# Patient Record
Sex: MALE | Race: WHITE | Employment: FULL TIME | URBAN - METROPOLITAN AREA
[De-identification: names, ages, dates, MRNs, and addresses within clinical notes are randomized per-mention and may not be internally consistent; named-entity substitution may affect disease eponyms.]

---

## 2018-02-27 PROBLEM — F41.9 ANXIETY: Status: ACTIVE | Noted: 2018-02-27

## 2018-02-27 PROBLEM — R45.1 AGITATION: Status: ACTIVE | Noted: 2018-02-27

## 2018-02-27 PROBLEM — F19.91 HISTORY OF DRUG USE: Status: ACTIVE | Noted: 2018-02-27

## 2018-02-27 PROBLEM — E10.10 UNCONTROLLED TYPE 1 DIABETES MELLITUS WITH KETOACIDOSIS WITHOUT COMA (HCC): Status: ACTIVE | Noted: 2018-02-27

## 2018-02-27 PROBLEM — Z86.19 HISTORY OF HEPATITIS C: Status: ACTIVE | Noted: 2018-02-27

## 2018-03-21 ENCOUNTER — HOSPITAL ENCOUNTER (OUTPATIENT)
Age: 18
Discharge: HOME | End: 2018-03-21
Payer: MEDICARE

## 2018-03-21 PROCEDURE — 82105 ALPHA-FETOPROTEIN SERUM: CPT

## 2018-03-23 ENCOUNTER — TELEPHONE (OUTPATIENT)
Dept: FAMILY MEDICINE CLINIC | Age: 18
End: 2018-03-23

## 2018-03-23 LAB — AFP-TUMOR MARKER: 2 NG/ML (ref 0–9)

## 2018-03-28 ENCOUNTER — TELEPHONE (OUTPATIENT)
Dept: FAMILY MEDICINE CLINIC | Age: 18
End: 2018-03-28

## 2018-05-09 DIAGNOSIS — Z86.19 HISTORY OF HEPATITIS C: Primary | ICD-10-CM

## 2018-06-06 ENCOUNTER — OFFICE VISIT (OUTPATIENT)
Dept: FAMILY MEDICINE CLINIC | Age: 18
End: 2018-06-06
Payer: MEDICARE

## 2018-06-06 VITALS
RESPIRATION RATE: 14 BRPM | HEART RATE: 84 BPM | HEIGHT: 69 IN | DIASTOLIC BLOOD PRESSURE: 60 MMHG | WEIGHT: 152 LBS | BODY MASS INDEX: 22.51 KG/M2 | SYSTOLIC BLOOD PRESSURE: 112 MMHG | OXYGEN SATURATION: 97 % | TEMPERATURE: 98.4 F

## 2018-06-06 DIAGNOSIS — L23.7 POISON IVY: Primary | ICD-10-CM

## 2018-06-06 PROCEDURE — 3046F HEMOGLOBIN A1C LEVEL >9.0%: CPT | Performed by: NURSE PRACTITIONER

## 2018-06-06 PROCEDURE — 1036F TOBACCO NON-USER: CPT | Performed by: NURSE PRACTITIONER

## 2018-06-06 PROCEDURE — G8420 CALC BMI NORM PARAMETERS: HCPCS | Performed by: NURSE PRACTITIONER

## 2018-06-06 PROCEDURE — 99213 OFFICE O/P EST LOW 20 MIN: CPT | Performed by: NURSE PRACTITIONER

## 2018-06-06 PROCEDURE — 2022F DILAT RTA XM EVC RTNOPTHY: CPT | Performed by: NURSE PRACTITIONER

## 2018-06-06 PROCEDURE — G8427 DOCREV CUR MEDS BY ELIG CLIN: HCPCS | Performed by: NURSE PRACTITIONER

## 2018-06-06 RX ORDER — TRIAMCINOLONE ACETONIDE 40 MG/ML
40 INJECTION, SUSPENSION INTRA-ARTICULAR; INTRAMUSCULAR ONCE
Status: COMPLETED | OUTPATIENT
Start: 2018-06-06 | End: 2018-06-06

## 2018-06-06 RX ORDER — METHYLPREDNISOLONE 4 MG/1
TABLET ORAL
Qty: 1 KIT | Refills: 0 | Status: SHIPPED | OUTPATIENT
Start: 2018-06-06 | End: 2018-06-12

## 2018-06-06 RX ADMIN — TRIAMCINOLONE ACETONIDE 40 MG: 40 INJECTION, SUSPENSION INTRA-ARTICULAR; INTRAMUSCULAR at 10:36

## 2018-06-06 ASSESSMENT — ENCOUNTER SYMPTOMS
EYE PAIN: 0
ABDOMINAL DISTENTION: 0
CHOKING: 0
STRIDOR: 0
COLOR CHANGE: 0
EYE REDNESS: 0
TROUBLE SWALLOWING: 0
CHEST TIGHTNESS: 0
RECTAL PAIN: 0
SHORTNESS OF BREATH: 0
ANAL BLEEDING: 0
BLOOD IN STOOL: 0
APNEA: 0
ABDOMINAL PAIN: 0
EYE DISCHARGE: 0
CONSTIPATION: 0
PHOTOPHOBIA: 0
NAUSEA: 0
EYE ITCHING: 0
COUGH: 0
VOMITING: 0
WHEEZING: 0
DIARRHEA: 0

## 2020-11-04 ENCOUNTER — TELEPHONE (OUTPATIENT)
Dept: ADMINISTRATIVE | Age: 20
End: 2020-11-04

## 2020-11-04 NOTE — TELEPHONE ENCOUNTER
Patient would like to establish with Dr Jaja Fuentes. Odalys Mika, is current patient.  Please advise if okay to schedule

## 2023-01-16 ENCOUNTER — OFFICE VISIT (OUTPATIENT)
Dept: ENDOCRINOLOGY | Age: 23
End: 2023-01-16

## 2023-01-16 VITALS
WEIGHT: 161 LBS | SYSTOLIC BLOOD PRESSURE: 107 MMHG | BODY MASS INDEX: 22.54 KG/M2 | RESPIRATION RATE: 16 BRPM | HEIGHT: 71 IN | DIASTOLIC BLOOD PRESSURE: 64 MMHG | HEART RATE: 89 BPM | OXYGEN SATURATION: 96 %

## 2023-01-16 DIAGNOSIS — E10.9 TYPE 1 DIABETES MELLITUS WITHOUT COMPLICATION (HCC): ICD-10-CM

## 2023-01-16 DIAGNOSIS — E10.10 UNCONTROLLED TYPE 1 DIABETES MELLITUS WITH KETOACIDOSIS WITHOUT COMA (HCC): Primary | ICD-10-CM

## 2023-01-16 DIAGNOSIS — E55.9 VITAMIN D DEFICIENCY: ICD-10-CM

## 2023-01-16 LAB — HBA1C MFR BLD: 8.8 %

## 2023-01-16 NOTE — PROGRESS NOTES
[address] [place] [address] Department of Endocrinology Diabetes and Metabolism   [address] [facility] [address]   Phone: [phone]  Fax: [phone]    Date of Service: 1/16/2023  Primary Care Physician: ZHANG Dinh - CNP  Referring physician: ZHANG Scott*  Provider: Slime Panda MD    Reason for the visit:  DM type 1    History of Present Illness: The history is provided by the patient. No  was used. Accuracy of the patient data is excellent. Chula Das is a very pleasant 23 y.o. male seen today for diabetes management   Chula Das was diagnosed with diabetes at age 1 and currently on Lantus 30 units nightly, Novolog 1:8g + sliding scale 2:50>150   The patient has been checking blood sugar   Most recent A1c results summarized below  Lab Results   Component Value Date/Time    LABA1C 8.8 01/16/2023 01:57 PM     Patient reported hypoglycemic episodes mainly at night   The patient hasn't been mindful of what has been eating and wasn't following diabetes diet    I reviewed current medications and the patient has no issues with diabetes medications  The patient is due for an eye exam. no h/o diabetic retinopathy  The patient  performs  own feet care  Microvascular complications:  No Retinopathy, Nephropathy or Neuropathy   Macrovascular complications: no CAD, PVD, or Stroke  The patient didn't receive  Flushot this year   Sister with DM type 1       PAST MEDICAL HISTORY   Past Medical History:   Diagnosis Date    History of drug use 2/27/2018       PAST SURGICAL HISTORY   No past surgical history on file. SOCIAL HISTORY   Tobacco:   reports that he has quit smoking. He has quit using smokeless tobacco.  Alcohol:   reports no history of alcohol use. Drugs:   reports no history of drug use. FAMILY HISTORY   No family history on file.     ALLERGIES AND DRUG REACTIONS   Allergies   Allergen Reactions    Shellfish-Derived Products CURRENT MEDICATIONS   Current Outpatient Medications   Medication Sig Dispense Refill    VENTOLIN  (90 Base) MCG/ACT inhaler inhale 2 puffs by mouth and INTO THE LUNGS every 4 to 6 hours if needed      insulin glargine (BASAGLAR KWIKPEN) 100 UNIT/ML injection pen Inject 30 units nightly 5 pen 3    Insulin Aspart (NOVOLOG FLEXPEN SC) Inject into the skin 1:8 carb ratio plus a scale. A max of 50 units/day       No current facility-administered medications for this visit. Review of Systems  Constitutional: No fever, no chills, no diaphoresis, no generalized weakness. HEENT: No blurred vision, No sore throat, no ear pain, no hair loss  Neck: denied any neck swelling, difficulty swallowing,   Cardio-pulmonary: No CP, SOB or palpitation, No orthopnea or PND. No cough or wheezing. GI: No N/V/D, no constipation, No abdominal pain, no melena or hematochezia   : Denied any dysuria, hematuria, flank pain, discharge, or incontinence. Skin: denied any rash, ulcer, Hirsute, or hyperpigmentation. MSK: denied any joint deformity, joint pain/swelling, muscle pain, or back pain. Neuro: no numbness, no tingling, no weakness, _    OBJECTIVE    /64   Pulse 89   Resp 16   Ht 5' 11\" (1.803 m)   Wt 161 lb (73 kg)   SpO2 96%   BMI 22.45 kg/m²   BP Readings from Last 4 Encounters:   01/16/23 107/64   06/06/18 112/60   02/22/18 104/69   02/15/18 109/66     Wt Readings from Last 6 Encounters:   01/16/23 161 lb (73 kg)   06/06/18 152 lb (68.9 kg) (46 %, Z= -0.09)*   02/22/18 154 lb (69.9 kg) (51 %, Z= 0.03)*   02/15/18 151 lb (68.5 kg) (47 %, Z= -0.09)*     * Growth percentiles are based on [facility] (Boys, 2-20 Years) data. Physical examination:  General: awake alert, oriented x3, no abnormal position or movements. HEENT: normocephalic non-traumatic, no exophthalmos   Neck: supple, no LN enlargement, no thyromegaly, no thyroid tenderness, no JVD.   Pulm: Clear equal air entry no added sounds, no wheezing or rhonchi    CVS: S1 + S2, no murmur, no heave. Dorsalis pedis pulse palpable   Abd: soft lax, no tenderness, no organomegaly, audible bowel sounds. Skin: warm, no lesions, no rash. No callus, no Ulcers, No acanthosis nigricans  Musculoskeletal: No back tenderness, no kyphosis/scoliosis    Neuro: CN intact, Monofilament sensation decreased bilateral , muscle power normal  Psych: normal mood, and affect    Review of Laboratory Data:  I personally reviewed the following lab:  Lab Results   Component Value Date/Time    WBC 9.4 11/02/2018 02:52 PM    RBC 4.27 (L) 11/02/2018 02:52 PM    HGB 12.9 (L) 11/02/2018 02:52 PM    HCT 37.1 (L) 11/02/2018 02:52 PM    MCV 86.9 11/02/2018 02:52 PM    MCH 30.2 11/02/2018 02:52 PM    MCHC 34.8 11/02/2018 02:52 PM    RDW 11.9 11/02/2018 02:52 PM     11/02/2018 02:52 PM    MPV 9.8 11/02/2018 02:52 PM      Lab Results   Component Value Date/Time     (L) 11/02/2018 02:52 PM    K 4.8 11/02/2018 02:52 PM    CO2 26 11/02/2018 02:52 PM    BUN 16 11/02/2018 02:52 PM    CREATININE 1.21 11/02/2018 02:52 PM    CALCIUM 8.6 11/02/2018 02:52 PM    LABGLOM >60 11/02/2018 02:52 PM    GFRAA > 60 11/02/2018 02:52 PM      No results found for: TSH, T4FREE, X7XZFTS, FT3, N4EDCLU, TSI, TPOABS, THGAB  Lab Results   Component Value Date/Time    LABA1C 8.8 01/16/2023 01:57 PM    GLUCOSE 685 11/02/2018 02:52 PM     Lab Results   Component Value Date/Time    LABA1C 8.8 01/16/2023 01:57 PM     No results found for: TRIG, HDL, LDLCALC, CHOL  No results found for: VITD25    ASSESSMENT & RECOMMENDATIONS   Harriet Bliss, a 23 y.o.-old male seen in for the following issues     Diabetes Mellitus Type 1    Patient's diabetes is uncontrol   Currently on Lantus 30 units nightly, Novolog 1:8g + sliding scale 2:50>150   The patient is an excellent candidate for insulin pump.  Will order pump and CGM    Discussed with patient A1c and blood sugar goals   Patient will need routine diabetes maintenance and prevention    Dietary noncompliance  Discussed with patient the importance of eating consistent carbohydrate meals, avoiding high glycemic index food. Also, discussed with patient the risk and negative consequences of dietary noncompliance on blood glucose control, blood pressure and weight    I personally reviewed external notes from PCP and other patient's care team providers, and personally interpreted labs associated with the above diagnosis. I also ordered labs to further assess and manage the above addressed medical conditions. Return in about 3 months (around 4/16/2023) for DM type 1, VitD deficiency. The above issues were reviewed with the patient who understood and agreed with the plan. Thank you for allowing us to participate in the care of this patient. Please do not hesitate to contact us with any additional questions. Diagnosis Orders   1. Uncontrolled type 1 diabetes mellitus with ketoacidosis without coma ([address]  POCT glycosylated hemoglobin (Hb A1C)      2. Type 1 diabetes mellitus without complication (HCC)  insulin glargine (BASAGLAR KWIKPEN) 100 UNIT/ML injection pen        Fabi Barth MD  Endocrinologist, [facility] End[facility]   [address] [place] [address]   Phone: [phone]  Fax: [phone]  --------------------------------------------  An electronic signature was used to authenticate this note.  Carlton Burdick MD on 1/16/2023 at 1:57 PM

## 2023-01-17 RX ORDER — INSULIN GLARGINE 100 [IU]/ML
INJECTION, SOLUTION SUBCUTANEOUS
Qty: 15 ML | Refills: 3 | Status: SHIPPED | OUTPATIENT
Start: 2023-01-17

## 2023-01-27 ENCOUNTER — TELEPHONE (OUTPATIENT)
Dept: ADMINISTRATIVE | Age: 23
End: 2023-01-27

## 2023-01-27 NOTE — TELEPHONE ENCOUNTER
Jerry GILBERT Nurse at [facility] would like to get patient's last office notes from 01/16 faxed to [phone]. Any questions call Isabel at [phone].

## 2023-02-05 PROBLEM — E55.9 VITAMIN D DEFICIENCY: Status: ACTIVE | Noted: 2023-02-05

## 2023-03-02 DIAGNOSIS — E10.10 UNCONTROLLED TYPE 1 DIABETES MELLITUS WITH KETOACIDOSIS WITHOUT COMA (HCC): Primary | ICD-10-CM

## 2023-03-03 RX ORDER — BLOOD SUGAR DIAGNOSTIC
1 STRIP MISCELLANEOUS
Qty: 200 EACH | Refills: 3 | Status: SHIPPED | OUTPATIENT
Start: 2023-03-03

## 2023-03-03 RX ORDER — LANCETS
1 EACH MISCELLANEOUS
Qty: 200 EACH | Refills: 11 | Status: SHIPPED | OUTPATIENT
Start: 2023-03-03

## 2023-03-03 RX ORDER — INSULIN ASPART 100 [IU]/ML
INJECTION, SOLUTION INTRAVENOUS; SUBCUTANEOUS
Qty: 30 ML | Refills: 5 | Status: SHIPPED | OUTPATIENT
Start: 2023-03-03

## 2023-03-21 DIAGNOSIS — E10.10 UNCONTROLLED TYPE 1 DIABETES MELLITUS WITH KETOACIDOSIS WITHOUT COMA (HCC): Primary | ICD-10-CM

## 2023-03-22 RX ORDER — LANCETS
1 EACH MISCELLANEOUS
Qty: 200 EACH | Refills: 11 | Status: SHIPPED | OUTPATIENT
Start: 2023-03-22

## 2023-03-22 RX ORDER — INSULIN ASPART 100 [IU]/ML
INJECTION, SOLUTION INTRAVENOUS; SUBCUTANEOUS
Qty: 20 ML | Refills: 5 | Status: SHIPPED | OUTPATIENT
Start: 2023-03-22

## 2023-03-22 RX ORDER — BLOOD SUGAR DIAGNOSTIC
1 STRIP MISCELLANEOUS
Qty: 200 EACH | Refills: 11 | Status: SHIPPED | OUTPATIENT
Start: 2023-03-22

## 2023-04-04 ENCOUNTER — TELEPHONE (OUTPATIENT)
Dept: ENDOCRINOLOGY | Age: 23
End: 2023-04-04

## 2023-08-23 ENCOUNTER — OFFICE VISIT (OUTPATIENT)
Dept: ENDOCRINOLOGY | Age: 23
End: 2023-08-23
Payer: COMMERCIAL

## 2023-08-23 ENCOUNTER — HOSPITAL ENCOUNTER (OUTPATIENT)
Age: 23
Discharge: HOME | End: 2023-08-23
Payer: COMMERCIAL

## 2023-08-23 VITALS
RESPIRATION RATE: 16 BRPM | BODY MASS INDEX: 24.22 KG/M2 | HEIGHT: 71 IN | WEIGHT: 173 LBS | SYSTOLIC BLOOD PRESSURE: 107 MMHG | DIASTOLIC BLOOD PRESSURE: 66 MMHG | HEART RATE: 79 BPM | OXYGEN SATURATION: 98 %

## 2023-08-23 DIAGNOSIS — Z96.41 INSULIN PUMP IN PLACE: ICD-10-CM

## 2023-08-23 DIAGNOSIS — Z91.119 DIETARY NONCOMPLIANCE: ICD-10-CM

## 2023-08-23 DIAGNOSIS — E10.65 TYPE 1 DIABETES MELLITUS WITH HYPERGLYCEMIA (HCC): ICD-10-CM

## 2023-08-23 DIAGNOSIS — E10.65 TYPE 1 DIABETES MELLITUS WITH HYPERGLYCEMIA (HCC): Primary | ICD-10-CM

## 2023-08-23 LAB
ALBUMIN SERPL-MCNC: 4.6 G/DL (ref 3.5–5.2)
ALP SERPL-CCNC: 84 U/L (ref 40–129)
ALT SERPL-CCNC: 52 U/L (ref 0–40)
ANION GAP SERPL CALCULATED.3IONS-SCNC: 9 MMOL/L (ref 7–16)
AST SERPL-CCNC: 27 U/L (ref 0–39)
BILIRUB SERPL-MCNC: 0.4 MG/DL (ref 0–1.2)
BUN SERPL-MCNC: 16 MG/DL (ref 6–20)
CALCIUM SERPL-MCNC: 9.3 MG/DL (ref 8.6–10.2)
CHLORIDE SERPL-SCNC: 101 MMOL/L (ref 98–107)
CHOLEST SERPL-MCNC: 163 MG/DL
CO2 SERPL-SCNC: 26 MMOL/L (ref 22–29)
CREAT SERPL-MCNC: 0.8 MG/DL (ref 0.7–1.2)
CREAT UR-MCNC: 130.9 MG/DL (ref 40–278)
GFR SERPL CREATININE-BSD FRML MDRD: >60 ML/MIN/1.73M2
GLUCOSE SERPL-MCNC: 238 MG/DL (ref 74–99)
HBA1C MFR BLD: 8.2 %
HDLC SERPL-MCNC: 54 MG/DL
LDLC SERPL CALC-MCNC: 95 MG/DL
MICROALBUMIN UR-MCNC: <12 MG/L (ref 0–19)
MICROALBUMIN/CREAT UR-RTO: NORMAL MCG/MG CREAT (ref 0–30)
POTASSIUM SERPL-SCNC: 4.2 MMOL/L (ref 3.5–5)
PROT SERPL-MCNC: 7.3 G/DL (ref 6.4–8.3)
SODIUM SERPL-SCNC: 136 MMOL/L (ref 132–146)
TRIGL SERPL-MCNC: 68 MG/DL
VLDLC SERPL CALC-MCNC: 14 MG/DL

## 2023-08-23 PROCEDURE — 80061 LIPID PANEL: CPT

## 2023-08-23 PROCEDURE — G8427 DOCREV CUR MEDS BY ELIG CLIN: HCPCS | Performed by: CLINICAL NURSE SPECIALIST

## 2023-08-23 PROCEDURE — 1036F TOBACCO NON-USER: CPT | Performed by: CLINICAL NURSE SPECIALIST

## 2023-08-23 PROCEDURE — G8420 CALC BMI NORM PARAMETERS: HCPCS | Performed by: CLINICAL NURSE SPECIALIST

## 2023-08-23 PROCEDURE — 82043 UR ALBUMIN QUANTITATIVE: CPT

## 2023-08-23 PROCEDURE — 2022F DILAT RTA XM EVC RTNOPTHY: CPT | Performed by: CLINICAL NURSE SPECIALIST

## 2023-08-23 PROCEDURE — 82570 ASSAY OF URINE CREATININE: CPT

## 2023-08-23 PROCEDURE — 83036 HEMOGLOBIN GLYCOSYLATED A1C: CPT | Performed by: CLINICAL NURSE SPECIALIST

## 2023-08-23 PROCEDURE — 3052F HG A1C>EQUAL 8.0%<EQUAL 9.0%: CPT | Performed by: CLINICAL NURSE SPECIALIST

## 2023-08-23 PROCEDURE — 36415 COLL VENOUS BLD VENIPUNCTURE: CPT

## 2023-08-23 PROCEDURE — 80053 COMPREHEN METABOLIC PANEL: CPT

## 2023-08-23 PROCEDURE — 99214 OFFICE O/P EST MOD 30 MIN: CPT | Performed by: CLINICAL NURSE SPECIALIST

## 2023-08-23 NOTE — PROGRESS NOTES
[address] [facility] Department of Endocrinology Diabetes and Metabolism   [address] [facility] [address]   Phone: [phone]  Fax: [phone]    Date of Service: 8/23/2023  Primary Care Physician: No primary care provider on file. Referring physician: No ref. provider found  Provider: ZHANG Kovacs - CNS    Reason for the visit:  DM type 1    History of Present Illness: The history is provided by the patient. No  was used. Accuracy of the patient data is excellent. Pako Rodriguez is a very pleasant 23 y.o. male seen today for diabetes management   Pako Rodriguez was diagnosed with diabetes at age 1 and currently on Medtronic insulin pump.   He is not using CGM as he has the guardian 4 but has not had training as of yet   Insulin pump settings: Midnight basal 1.25, carb ratio 7, sensitivity 40, blood glucose target 110-150, active insulin time 3 hours  Had steriod injection 2 weeks ago   The patient has been checking blood sugar four times daily however he does not put them into pump so there is really not much data to review  Most recent A1c results summarized below  Lab Results   Component Value Date/Time    LABA1C 8.2 08/23/2023 11:50 AM    LABA1C 8.8 01/16/2023 01:57 PM     Patient reported hypoglycemic episodes mainly at night   The patient hasn't been mindful of what has been eating and wasn't following diabetes diet    I reviewed current medications and the patient has no issues with diabetes medications  The patient is due for an eye exam. no h/o diabetic retinopathy  The patient  performs  own feet care  Microvascular complications:  No Retinopathy, Nephropathy or Neuropathy   Macrovascular complications: no CAD, PVD, or Stroke  The patient didn't receive  Flushot this year   Sister with DM type 1       PAST MEDICAL HISTORY   Past Medical History:   Diagnosis Date    History of drug use 2/27/2018       PAST SURGICAL HISTORY   No past surgical history on

## 2023-11-08 ENCOUNTER — TELEPHONE (OUTPATIENT)
Dept: ADMINISTRATIVE | Age: 23
End: 2023-11-08

## 2023-11-08 NOTE — TELEPHONE ENCOUNTER
Pt called and requested that a note be faxed to his employer, [employer], stating that he cannot work around heat. The fax number is [phone]. He said that it breaks his insulin pump. Sometimes it does not work and other times it heats up his insulin and is unable to use it. He said he had to miss work 11/6 and 11/7 because of this. He was requesting a work excuse also. Please contact pt if any questions.

## 2023-11-13 NOTE — TELEPHONE ENCOUNTER
Insulin should not be exposed to excessive heat or cold. I am not sure what he does for work. Please inquire.

## 2024-01-03 ENCOUNTER — OFFICE VISIT (OUTPATIENT)
Dept: ENDOCRINOLOGY | Age: 24
End: 2024-01-03
Payer: COMMERCIAL

## 2024-01-03 VITALS
BODY MASS INDEX: 24.69 KG/M2 | HEART RATE: 86 BPM | WEIGHT: 177 LBS | DIASTOLIC BLOOD PRESSURE: 78 MMHG | SYSTOLIC BLOOD PRESSURE: 120 MMHG | OXYGEN SATURATION: 97 %

## 2024-01-03 DIAGNOSIS — Z91.119 DIETARY NONCOMPLIANCE: ICD-10-CM

## 2024-01-03 DIAGNOSIS — E10.10 UNCONTROLLED TYPE 1 DIABETES MELLITUS WITH KETOACIDOSIS WITHOUT COMA (HCC): ICD-10-CM

## 2024-01-03 DIAGNOSIS — Z96.41 INSULIN PUMP IN PLACE: ICD-10-CM

## 2024-01-03 DIAGNOSIS — E10.65 TYPE 1 DIABETES MELLITUS WITH HYPERGLYCEMIA (HCC): Primary | ICD-10-CM

## 2024-01-03 LAB — HBA1C MFR BLD: 9 %

## 2024-01-03 PROCEDURE — 1036F TOBACCO NON-USER: CPT | Performed by: INTERNAL MEDICINE

## 2024-01-03 PROCEDURE — G8420 CALC BMI NORM PARAMETERS: HCPCS | Performed by: INTERNAL MEDICINE

## 2024-01-03 PROCEDURE — 2022F DILAT RTA XM EVC RTNOPTHY: CPT | Performed by: INTERNAL MEDICINE

## 2024-01-03 PROCEDURE — 83036 HEMOGLOBIN GLYCOSYLATED A1C: CPT | Performed by: INTERNAL MEDICINE

## 2024-01-03 PROCEDURE — 3052F HG A1C>EQUAL 8.0%<EQUAL 9.0%: CPT | Performed by: INTERNAL MEDICINE

## 2024-01-03 PROCEDURE — 99214 OFFICE O/P EST MOD 30 MIN: CPT | Performed by: INTERNAL MEDICINE

## 2024-01-03 PROCEDURE — G8427 DOCREV CUR MEDS BY ELIG CLIN: HCPCS | Performed by: INTERNAL MEDICINE

## 2024-01-03 PROCEDURE — G8484 FLU IMMUNIZE NO ADMIN: HCPCS | Performed by: INTERNAL MEDICINE

## 2024-01-03 RX ORDER — BLOOD SUGAR DIAGNOSTIC
1 STRIP MISCELLANEOUS
Qty: 200 EACH | Refills: 3 | Status: SHIPPED | OUTPATIENT
Start: 2024-01-03

## 2024-01-03 RX ORDER — INSULIN ASPART 100 [IU]/ML
INJECTION, SOLUTION INTRAVENOUS; SUBCUTANEOUS
Qty: 30 ML | Refills: 5 | Status: SHIPPED | OUTPATIENT
Start: 2024-01-03

## 2024-01-03 NOTE — PROGRESS NOTES
CREATININE 0.8 08/23/2023 01:00 PM    CALCIUM 9.3 08/23/2023 01:00 PM    LABGLOM >60 08/23/2023 01:00 PM    GFRAA > 60 11/02/2018 02:52 PM      No results found for: \"TSH\", \"T4FREE\", \"M8RSRGG\", \"FT3\", \"X0RHORJ\", \"TSI\", \"TPOABS\", \"THGAB\"  Lab Results   Component Value Date/Time    LABA1C 9.0 01/03/2024 10:49 AM    GLUCOSE 238 08/23/2023 01:00 PM    GLUCOSE 685 11/02/2018 02:52 PM    MALBCR Can not be calculated 08/23/2023 01:05 PM    LABCREA 130.9 08/23/2023 01:05 PM     Lab Results   Component Value Date/Time    LABA1C 9.0 01/03/2024 10:49 AM    LABA1C 8.2 08/23/2023 11:50 AM    LABA1C 8.8 01/16/2023 01:57 PM     Lab Results   Component Value Date/Time    TRIG 68 08/23/2023 01:00 PM    HDL 54 08/23/2023 01:00 PM    CHOL 163 08/23/2023 01:00 PM     No results found for: \"VITD25\"    ASSESSMENT & RECOMMENDATIONS   Rashad Diaz, a 23 y.o.-old male seen in for the following issues     Diabetes Mellitus Type 1    Uncontrolled due to poor compliance with the glucose check and noncompliance with following diabetic diet  The patient already received guardian for transmitter and sensors however has not completed upgrade to 780 G system as of yet  He will be scheduled with our pump  very soon to start upgrading process  We do have enough data today to make significant adjustments in his insulin pump  We will continue insulin pump settings as above  Will continue the current insulin pump settings basal rate 1.25, carb ratio 7, insulin sensitivity 40, goal 120-150, active insulin time 2 hours  Diabetes blood work before next visit    Dietary noncompliance  Discussed with patient the importance of eating consistent carbohydrate meals, avoiding high glycemic index food. Also, discussed with patient the risk and negative consequences of dietary noncompliance on blood glucose control, blood pressure and weight    I personally reviewed external notes from PCP and other patient's care team providers, and personally

## 2024-01-06 LAB
ANION GAP SERPL CALCULATED.3IONS-SCNC: 8 MEQ/L (ref 4–14)
BUN BLDV-MCNC: 13 MG/DL (ref 7–25)
CALCIUM SERPL-MCNC: 10.2 MG/DL (ref 8.5–10.5)
CHLORIDE BLD-SCNC: 103 MEQ/L (ref 98–107)
CHOLESTEROL: 178 MG/DL (ref 0–199)
CO2: 30 MEQ/L (ref 21–31)
CREAT SERPL-MCNC: 0.99 MG/DL (ref 0.7–1.3)
CREATININE + EGFR PANEL: 111 ML/MIN
ESTIMATED AVERAGE GLUCOSE: 217 MG/DL
GFR NON-AFRICAN AMERICAN: 92 ML/MIN
GLUCOSE BLD-MCNC: 99 MG/DL (ref 70–99)
HBA1C MFR BLD: 9.2 % (ref 4–6)
HDLC SERPL-MCNC: 50 MG/DL
LDL CHOLESTEROL: 112 MG/DL (ref 0–99)
LDL/HDL RATIO: 2.2 RATIO
MICROALBUMIN UR-MCNC: 4.2 MG/DL (ref 0–1.8)
POTASSIUM SERPL-SCNC: 4.1 MEQ/L (ref 3.6–5)
SODIUM BLD-SCNC: 141 MEQ/L (ref 135–145)
TRIGL SERPL-MCNC: 83 MG/DL (ref 0–149)
TSH SERPL DL<=0.05 MIU/L-ACNC: 0.64 UIU/ML (ref 0.34–5.6)

## 2024-09-30 ENCOUNTER — OFFICE VISIT (OUTPATIENT)
Dept: ENDOCRINOLOGY | Age: 24
End: 2024-09-30
Payer: COMMERCIAL

## 2024-09-30 VITALS
WEIGHT: 176 LBS | DIASTOLIC BLOOD PRESSURE: 74 MMHG | BODY MASS INDEX: 24.64 KG/M2 | OXYGEN SATURATION: 98 % | SYSTOLIC BLOOD PRESSURE: 107 MMHG | HEIGHT: 71 IN | HEART RATE: 82 BPM

## 2024-09-30 DIAGNOSIS — E55.9 VITAMIN D DEFICIENCY: ICD-10-CM

## 2024-09-30 DIAGNOSIS — E10.65 TYPE 1 DIABETES MELLITUS WITH HYPERGLYCEMIA (HCC): Primary | ICD-10-CM

## 2024-09-30 DIAGNOSIS — Z96.41 INSULIN PUMP IN PLACE: ICD-10-CM

## 2024-09-30 DIAGNOSIS — Z91.119 DIETARY NONCOMPLIANCE: ICD-10-CM

## 2024-09-30 LAB — HBA1C MFR BLD: 8.6 %

## 2024-09-30 PROCEDURE — 99214 OFFICE O/P EST MOD 30 MIN: CPT | Performed by: CLINICAL NURSE SPECIALIST

## 2024-09-30 PROCEDURE — 95251 CONT GLUC MNTR ANALYSIS I&R: CPT | Performed by: CLINICAL NURSE SPECIALIST

## 2024-09-30 PROCEDURE — 3052F HG A1C>EQUAL 8.0%<EQUAL 9.0%: CPT | Performed by: CLINICAL NURSE SPECIALIST

## 2024-09-30 PROCEDURE — 2022F DILAT RTA XM EVC RTNOPTHY: CPT | Performed by: CLINICAL NURSE SPECIALIST

## 2024-09-30 PROCEDURE — 83036 HEMOGLOBIN GLYCOSYLATED A1C: CPT | Performed by: CLINICAL NURSE SPECIALIST

## 2024-09-30 PROCEDURE — 1036F TOBACCO NON-USER: CPT | Performed by: CLINICAL NURSE SPECIALIST

## 2024-09-30 PROCEDURE — G8427 DOCREV CUR MEDS BY ELIG CLIN: HCPCS | Performed by: CLINICAL NURSE SPECIALIST

## 2024-09-30 PROCEDURE — G8420 CALC BMI NORM PARAMETERS: HCPCS | Performed by: CLINICAL NURSE SPECIALIST

## 2024-09-30 RX ORDER — INSULIN ASPART 100 [IU]/ML
INJECTION, SOLUTION INTRAVENOUS; SUBCUTANEOUS
Qty: 20 ML | Refills: 5 | Status: SHIPPED
Start: 2024-09-30 | End: 2024-10-03

## 2024-09-30 NOTE — PROGRESS NOTES
[employer]  [facility] Department of Endocrinology Diabetes and Metabolism   [address] [facility] [address], [place], [place], [address]   Phone: [phone]  Fax: [phone]    Date of Service: 9/30/2024  Primary Care Physician: No primary care provider on file.  Referring physician: No ref. provider found  Provider: ZHANG Gonzalez - CNS    Reason for the visit:  DM type 1    History of Present Illness:  The history is provided by the patient. No  was used. Accuracy of the patient data is excellent.  Rashad Diaz is a very pleasant 23 y.o. male seen today for diabetes management   Rashad Diaz was diagnosed with diabetes at age 1 and currently on Medtronic insulin pump.    Insulin pump settings: Midnight basal 1.25, carb ratio 7, sensitivity 40, blood glucose target 110-150, active insulin time 3 hours  Ambulatory continuous glucose monitoring of interstitial tissue fluid via a subcutaneous sensor for a minimum of 72 hours; analysis, interpretation and report  Average sensor glucose 155  Time in range 70%  Hyperglycemia 29%  Hypoglycemia 1%    The patient has been checking blood sugar four times daily however he does not put them into pump so there is really not much data to review  Most recent A1c results summarized below  Lab Results   Component Value Date/Time    LABA1C 8.6 09/30/2024 09:15 AM    LABA1C 9.2 01/06/2024 08:20 AM    LABA1C 9.0 01/03/2024 10:49 AM     Patient reported hypoglycemic episodes mainly at night   The patient hasn't been mindful of what has been eating and wasn't following diabetes diet    I reviewed current medications and the patient has no issues with diabetes medications  The patient is due for an eye exam. no h/o diabetic retinopathy  The patient  performs  own feet care  Microvascular complications:  No Retinopathy, Nephropathy or Neuropathy   Macrovascular complications: no CAD, PVD, or Stroke  The patient didn't receive  Flushot this year   Sister

## 2024-10-03 DIAGNOSIS — E10.10 UNCONTROLLED TYPE 1 DIABETES MELLITUS WITH KETOACIDOSIS WITHOUT COMA (HCC): ICD-10-CM

## 2024-10-03 RX ORDER — INSULIN ASPART 100 [IU]/ML
INJECTION, SOLUTION INTRAVENOUS; SUBCUTANEOUS
Qty: 30 ML | Refills: 5 | Status: SHIPPED | OUTPATIENT
Start: 2024-10-03

## 2025-08-05 ENCOUNTER — HOSPITAL ENCOUNTER (EMERGENCY)
Age: 25
Discharge: HOME | End: 2025-08-06
Attending: EMERGENCY MEDICINE
Payer: COMMERCIAL

## 2025-08-05 DIAGNOSIS — M79.603 PAIN OF UPPER EXTREMITY, UNSPECIFIED LATERALITY: ICD-10-CM

## 2025-08-05 DIAGNOSIS — R73.9 HYPERGLYCEMIA: Primary | ICD-10-CM

## 2025-08-05 PROCEDURE — 96361 HYDRATE IV INFUSION ADD-ON: CPT

## 2025-08-05 PROCEDURE — 96374 THER/PROPH/DIAG INJ IV PUSH: CPT

## 2025-08-05 PROCEDURE — 99285 EMERGENCY DEPT VISIT HI MDM: CPT

## 2025-08-05 RX ORDER — 0.9 % SODIUM CHLORIDE 0.9 %
1000 INTRAVENOUS SOLUTION INTRAVENOUS ONCE
Status: COMPLETED | OUTPATIENT
Start: 2025-08-06 | End: 2025-08-06

## 2025-08-05 RX ORDER — ONDANSETRON 2 MG/ML
4 INJECTION INTRAMUSCULAR; INTRAVENOUS ONCE
Status: COMPLETED | OUTPATIENT
Start: 2025-08-06 | End: 2025-08-06

## 2025-08-05 RX ORDER — HYDROCODONE BITARTRATE AND ACETAMINOPHEN 5; 325 MG/1; MG/1
2 TABLET ORAL ONCE
Status: COMPLETED | OUTPATIENT
Start: 2025-08-06 | End: 2025-08-06

## 2025-08-05 ASSESSMENT — PAIN - FUNCTIONAL ASSESSMENT: PAIN_FUNCTIONAL_ASSESSMENT: NONE - DENIES PAIN

## 2025-08-06 ENCOUNTER — APPOINTMENT (OUTPATIENT)
Dept: GENERAL RADIOLOGY | Age: 25
End: 2025-08-06
Payer: COMMERCIAL

## 2025-08-06 VITALS
BODY MASS INDEX: 22.4 KG/M2 | OXYGEN SATURATION: 98 % | WEIGHT: 160 LBS | DIASTOLIC BLOOD PRESSURE: 68 MMHG | HEIGHT: 71 IN | SYSTOLIC BLOOD PRESSURE: 104 MMHG | RESPIRATION RATE: 16 BRPM | HEART RATE: 71 BPM | TEMPERATURE: 97.9 F

## 2025-08-06 LAB
ALBUMIN SERPL-MCNC: 4.4 G/DL (ref 3.5–5.2)
ALP SERPL-CCNC: 86 U/L (ref 40–129)
ALT SERPL-CCNC: 78 U/L (ref 0–50)
ANION GAP SERPL CALCULATED.3IONS-SCNC: 14 MMOL/L (ref 7–16)
AST SERPL-CCNC: 63 U/L (ref 0–50)
B-OH-BUTYR SERPL-MCNC: 0.55 MMOL/L (ref 0.02–0.27)
BASOPHILS # BLD: 0.07 K/UL (ref 0–0.2)
BASOPHILS NFR BLD: 1 % (ref 0–2)
BILIRUB SERPL-MCNC: 0.5 MG/DL (ref 0–1.2)
BUN SERPL-MCNC: 18 MG/DL (ref 6–20)
CALCIUM SERPL-MCNC: 9.4 MG/DL (ref 8.6–10)
CHLORIDE SERPL-SCNC: 100 MMOL/L (ref 98–107)
CHP ED QC CHECK: NORMAL
CO2 SERPL-SCNC: 24 MMOL/L (ref 22–29)
CREAT SERPL-MCNC: 0.8 MG/DL (ref 0.7–1.2)
EOSINOPHIL # BLD: 0.15 K/UL (ref 0.05–0.5)
EOSINOPHILS RELATIVE PERCENT: 2 % (ref 0–6)
ERYTHROCYTE [DISTWIDTH] IN BLOOD BY AUTOMATED COUNT: 12.7 % (ref 11.5–15)
GFR, ESTIMATED: >90 ML/MIN/1.73M2
GLUCOSE BLD-MCNC: 264 MG/DL
GLUCOSE BLD-MCNC: 264 MG/DL (ref 74–99)
GLUCOSE SERPL-MCNC: 373 MG/DL (ref 74–99)
HCT VFR BLD AUTO: 41.8 % (ref 37–54)
HGB BLD-MCNC: 14.6 G/DL (ref 12.5–16.5)
IMM GRANULOCYTES # BLD AUTO: <0.03 K/UL (ref 0–0.58)
IMM GRANULOCYTES NFR BLD: 0 % (ref 0–5)
LIPASE SERPL-CCNC: 17 U/L (ref 13–60)
LYMPHOCYTES NFR BLD: 1.55 K/UL (ref 1.5–4)
LYMPHOCYTES RELATIVE PERCENT: 24 % (ref 20–42)
MCH RBC QN AUTO: 31.3 PG (ref 26–35)
MCHC RBC AUTO-ENTMCNC: 34.9 G/DL (ref 32–34.5)
MCV RBC AUTO: 89.7 FL (ref 80–99.9)
MONOCYTES NFR BLD: 1.13 K/UL (ref 0.1–0.95)
MONOCYTES NFR BLD: 17 % (ref 2–12)
NEUTROPHILS NFR BLD: 55 % (ref 43–80)
NEUTS SEG NFR BLD: 3.61 K/UL (ref 1.8–7.3)
PH VENOUS: 7.41 (ref 7.35–7.45)
PLATELET # BLD AUTO: 333 K/UL (ref 130–450)
PMV BLD AUTO: 9.9 FL (ref 7–12)
POTASSIUM SERPL-SCNC: 4.7 MMOL/L (ref 3.5–5.1)
PROT SERPL-MCNC: 7.4 G/DL (ref 6.4–8.3)
RBC # BLD AUTO: 4.66 M/UL (ref 3.8–5.8)
SODIUM SERPL-SCNC: 138 MMOL/L (ref 136–145)
TROPONIN I SERPL HS-MCNC: 9 NG/L (ref 0–22)
WBC OTHER # BLD: 6.5 K/UL (ref 4.5–11.5)

## 2025-08-06 PROCEDURE — 82800 BLOOD PH: CPT

## 2025-08-06 PROCEDURE — 93005 ELECTROCARDIOGRAM TRACING: CPT | Performed by: EMERGENCY MEDICINE

## 2025-08-06 PROCEDURE — 84484 ASSAY OF TROPONIN QUANT: CPT

## 2025-08-06 PROCEDURE — 80053 COMPREHEN METABOLIC PANEL: CPT

## 2025-08-06 PROCEDURE — 6370000000 HC RX 637 (ALT 250 FOR IP): Performed by: EMERGENCY MEDICINE

## 2025-08-06 PROCEDURE — 71045 X-RAY EXAM CHEST 1 VIEW: CPT

## 2025-08-06 PROCEDURE — 85025 COMPLETE CBC W/AUTO DIFF WBC: CPT

## 2025-08-06 PROCEDURE — 82962 GLUCOSE BLOOD TEST: CPT

## 2025-08-06 PROCEDURE — 2580000003 HC RX 258: Performed by: EMERGENCY MEDICINE

## 2025-08-06 PROCEDURE — 83690 ASSAY OF LIPASE: CPT

## 2025-08-06 PROCEDURE — 82010 KETONE BODYS QUAN: CPT

## 2025-08-06 PROCEDURE — 6360000002 HC RX W HCPCS: Performed by: EMERGENCY MEDICINE

## 2025-08-06 RX ORDER — NAPROXEN 500 MG/1
500 TABLET ORAL 2 TIMES DAILY
Qty: 10 TABLET | Refills: 0 | Status: SHIPPED | OUTPATIENT
Start: 2025-08-06 | End: 2025-08-11

## 2025-08-06 RX ADMIN — ONDANSETRON 4 MG: 2 INJECTION, SOLUTION INTRAMUSCULAR; INTRAVENOUS at 00:16

## 2025-08-06 RX ADMIN — SODIUM CHLORIDE 1000 ML: 0.9 INJECTION, SOLUTION INTRAVENOUS at 00:16

## 2025-08-06 RX ADMIN — HYDROCODONE BITARTRATE AND ACETAMINOPHEN 2 TABLET: 5; 325 TABLET ORAL at 00:16

## 2025-08-06 ASSESSMENT — PAIN SCALES - GENERAL: PAINLEVEL_OUTOF10: 8

## 2025-08-06 ASSESSMENT — PAIN - FUNCTIONAL ASSESSMENT: PAIN_FUNCTIONAL_ASSESSMENT: 0-10

## 2025-08-07 LAB
EKG ATRIAL RATE: 66 BPM
EKG P AXIS: 46 DEGREES
EKG P-R INTERVAL: 124 MS
EKG Q-T INTERVAL: 408 MS
EKG QRS DURATION: 88 MS
EKG QTC CALCULATION (BAZETT): 427 MS
EKG R AXIS: 31 DEGREES
EKG T AXIS: 63 DEGREES
EKG VENTRICULAR RATE: 66 BPM

## 2025-08-07 PROCEDURE — 93010 ELECTROCARDIOGRAM REPORT: CPT | Performed by: INTERNAL MEDICINE

## 2025-08-16 ENCOUNTER — HOSPITAL ENCOUNTER (EMERGENCY)
Age: 25
Discharge: LEFT BEFORE DISCHARGE | End: 2025-08-17
Attending: EMERGENCY MEDICINE
Payer: COMMERCIAL

## 2025-08-16 ENCOUNTER — APPOINTMENT (OUTPATIENT)
Dept: CT IMAGING | Age: 25
End: 2025-08-16
Payer: COMMERCIAL

## 2025-08-16 DIAGNOSIS — R11.2 NAUSEA AND VOMITING, UNSPECIFIED VOMITING TYPE: Primary | ICD-10-CM

## 2025-08-16 DIAGNOSIS — F12.90 MARIJUANA USE: ICD-10-CM

## 2025-08-16 DIAGNOSIS — Z53.29 LEFT AGAINST MEDICAL ADVICE: ICD-10-CM

## 2025-08-16 DIAGNOSIS — R10.13 ABDOMINAL PAIN, EPIGASTRIC: ICD-10-CM

## 2025-08-16 LAB
ALBUMIN SERPL-MCNC: 4 G/DL (ref 3.5–5.2)
ALP SERPL-CCNC: 74 U/L (ref 40–129)
ALT SERPL-CCNC: 41 U/L (ref 0–50)
AMPHET UR QL SCN: NEGATIVE
ANION GAP SERPL CALCULATED.3IONS-SCNC: 13 MMOL/L (ref 7–16)
AST SERPL-CCNC: 23 U/L (ref 0–50)
B-OH-BUTYR SERPL-MCNC: 0.41 MMOL/L (ref 0.02–0.27)
BARBITURATES UR QL SCN: NEGATIVE
BASOPHILS # BLD: 0.05 K/UL (ref 0–0.2)
BASOPHILS NFR BLD: 0 % (ref 0–2)
BENZODIAZ UR QL: NEGATIVE
BILIRUB SERPL-MCNC: 0.7 MG/DL (ref 0–1.2)
BILIRUB UR QL STRIP: ABNORMAL
BUN SERPL-MCNC: 19 MG/DL (ref 6–20)
BUPRENORPHINE UR QL: NEGATIVE
CALCIUM SERPL-MCNC: 8.9 MG/DL (ref 8.6–10)
CANNABINOIDS UR QL SCN: POSITIVE
CHLORIDE SERPL-SCNC: 103 MMOL/L (ref 98–107)
CLARITY UR: CLEAR
CO2 SERPL-SCNC: 24 MMOL/L (ref 22–29)
COCAINE UR QL SCN: NEGATIVE
COLOR UR: YELLOW
CREAT SERPL-MCNC: 0.8 MG/DL (ref 0.7–1.2)
EOSINOPHIL # BLD: 0.14 K/UL (ref 0.05–0.5)
EOSINOPHILS RELATIVE PERCENT: 1 % (ref 0–6)
ERYTHROCYTE [DISTWIDTH] IN BLOOD BY AUTOMATED COUNT: 12.5 % (ref 11.5–15)
FENTANYL UR QL: NEGATIVE
GFR, ESTIMATED: >90 ML/MIN/1.73M2
GLUCOSE SERPL-MCNC: 125 MG/DL (ref 74–99)
GLUCOSE UR STRIP-MCNC: NEGATIVE MG/DL
HCT VFR BLD AUTO: 44.8 % (ref 37–54)
HGB BLD-MCNC: 15.1 G/DL (ref 12.5–16.5)
HGB UR QL STRIP.AUTO: ABNORMAL
IMM GRANULOCYTES # BLD AUTO: 0.06 K/UL (ref 0–0.58)
IMM GRANULOCYTES NFR BLD: 0 % (ref 0–5)
KETONES UR STRIP-MCNC: 15 MG/DL
LACTATE BLDV-SCNC: 0.9 MMOL/L (ref 0.5–1.9)
LEUKOCYTE ESTERASE UR QL STRIP: NEGATIVE
LIPASE SERPL-CCNC: 12 U/L (ref 13–60)
LYMPHOCYTES NFR BLD: 0.43 K/UL (ref 1.5–4)
LYMPHOCYTES RELATIVE PERCENT: 3 % (ref 20–42)
MCH RBC QN AUTO: 30.6 PG (ref 26–35)
MCHC RBC AUTO-ENTMCNC: 33.7 G/DL (ref 32–34.5)
MCV RBC AUTO: 90.9 FL (ref 80–99.9)
METHADONE UR QL: NEGATIVE
MONOCYTES NFR BLD: 1.33 K/UL (ref 0.1–0.95)
MONOCYTES NFR BLD: 8 % (ref 2–12)
NEUTROPHILS NFR BLD: 88 % (ref 43–80)
NEUTS SEG NFR BLD: 14.19 K/UL (ref 1.8–7.3)
NITRITE UR QL STRIP: NEGATIVE
OPIATES UR QL SCN: NEGATIVE
OXYCODONE UR QL SCN: NEGATIVE
PCP UR QL SCN: NEGATIVE
PH UR STRIP: 6 [PH] (ref 5–8)
PH VENOUS: 7.41 (ref 7.35–7.45)
PLATELET # BLD AUTO: 348 K/UL (ref 130–450)
PMV BLD AUTO: 9.6 FL (ref 7–12)
POTASSIUM SERPL-SCNC: 3.9 MMOL/L (ref 3.5–5.1)
PROT SERPL-MCNC: 6.5 G/DL (ref 6.4–8.3)
PROT UR STRIP-MCNC: 30 MG/DL
RBC # BLD AUTO: 4.93 M/UL (ref 3.8–5.8)
RBC # BLD: NORMAL 10*6/UL
RBC #/AREA URNS HPF: NORMAL /HPF
SODIUM SERPL-SCNC: 139 MMOL/L (ref 136–145)
SP GR UR STRIP: >1.03 (ref 1–1.03)
TEST INFORMATION: ABNORMAL
TROPONIN I SERPL HS-MCNC: 22 NG/L (ref 0–22)
TROPONIN I SERPL HS-MCNC: 8 NG/L (ref 0–22)
UROBILINOGEN UR STRIP-ACNC: 0.2 EU/DL (ref 0–1)
WBC #/AREA URNS HPF: NORMAL /HPF
WBC OTHER # BLD: 16.2 K/UL (ref 4.5–11.5)

## 2025-08-16 PROCEDURE — G0480 DRUG TEST DEF 1-7 CLASSES: HCPCS

## 2025-08-16 PROCEDURE — 96375 TX/PRO/DX INJ NEW DRUG ADDON: CPT

## 2025-08-16 PROCEDURE — 83605 ASSAY OF LACTIC ACID: CPT

## 2025-08-16 PROCEDURE — 96374 THER/PROPH/DIAG INJ IV PUSH: CPT

## 2025-08-16 PROCEDURE — 74177 CT ABD & PELVIS W/CONTRAST: CPT

## 2025-08-16 PROCEDURE — 93005 ELECTROCARDIOGRAM TRACING: CPT | Performed by: EMERGENCY MEDICINE

## 2025-08-16 PROCEDURE — 6360000004 HC RX CONTRAST MEDICATION: Performed by: RADIOLOGY

## 2025-08-16 PROCEDURE — 2580000003 HC RX 258: Performed by: EMERGENCY MEDICINE

## 2025-08-16 PROCEDURE — 6360000002 HC RX W HCPCS: Performed by: EMERGENCY MEDICINE

## 2025-08-16 PROCEDURE — 84484 ASSAY OF TROPONIN QUANT: CPT

## 2025-08-16 PROCEDURE — 82010 KETONE BODYS QUAN: CPT

## 2025-08-16 PROCEDURE — 83690 ASSAY OF LIPASE: CPT

## 2025-08-16 PROCEDURE — 82800 BLOOD PH: CPT

## 2025-08-16 PROCEDURE — 81001 URINALYSIS AUTO W/SCOPE: CPT

## 2025-08-16 PROCEDURE — 80179 DRUG ASSAY SALICYLATE: CPT

## 2025-08-16 PROCEDURE — 80053 COMPREHEN METABOLIC PANEL: CPT

## 2025-08-16 PROCEDURE — 99285 EMERGENCY DEPT VISIT HI MDM: CPT

## 2025-08-16 PROCEDURE — 80307 DRUG TEST PRSMV CHEM ANLYZR: CPT

## 2025-08-16 PROCEDURE — 85025 COMPLETE CBC W/AUTO DIFF WBC: CPT

## 2025-08-16 PROCEDURE — 80143 DRUG ASSAY ACETAMINOPHEN: CPT

## 2025-08-16 RX ORDER — PANTOPRAZOLE SODIUM 40 MG/10ML
40 INJECTION, POWDER, LYOPHILIZED, FOR SOLUTION INTRAVENOUS ONCE
Status: COMPLETED | OUTPATIENT
Start: 2025-08-16 | End: 2025-08-16

## 2025-08-16 RX ORDER — 0.9 % SODIUM CHLORIDE 0.9 %
1000 INTRAVENOUS SOLUTION INTRAVENOUS ONCE
Status: COMPLETED | OUTPATIENT
Start: 2025-08-16 | End: 2025-08-16

## 2025-08-16 RX ORDER — FENTANYL CITRATE 50 UG/ML
25 INJECTION, SOLUTION INTRAMUSCULAR; INTRAVENOUS ONCE
Refills: 0 | Status: DISCONTINUED | OUTPATIENT
Start: 2025-08-16 | End: 2025-08-16

## 2025-08-16 RX ORDER — IOPAMIDOL 755 MG/ML
75 INJECTION, SOLUTION INTRAVASCULAR
Status: COMPLETED | OUTPATIENT
Start: 2025-08-16 | End: 2025-08-16

## 2025-08-16 RX ORDER — ONDANSETRON 2 MG/ML
4 INJECTION INTRAMUSCULAR; INTRAVENOUS ONCE
Status: COMPLETED | OUTPATIENT
Start: 2025-08-16 | End: 2025-08-16

## 2025-08-16 RX ADMIN — SODIUM CHLORIDE 1000 ML: 0.9 INJECTION, SOLUTION INTRAVENOUS at 22:59

## 2025-08-16 RX ADMIN — IOPAMIDOL 75 ML: 755 INJECTION, SOLUTION INTRAVENOUS at 20:46

## 2025-08-16 RX ADMIN — ONDANSETRON 4 MG: 2 INJECTION, SOLUTION INTRAMUSCULAR; INTRAVENOUS at 20:05

## 2025-08-16 RX ADMIN — PANTOPRAZOLE SODIUM 40 MG: 40 INJECTION, POWDER, FOR SOLUTION INTRAVENOUS at 20:06

## 2025-08-16 RX ADMIN — SODIUM CHLORIDE 1000 ML: 0.9 INJECTION, SOLUTION INTRAVENOUS at 19:44

## 2025-08-16 ASSESSMENT — PAIN DESCRIPTION - ORIENTATION: ORIENTATION: RIGHT;UPPER

## 2025-08-16 ASSESSMENT — PAIN DESCRIPTION - LOCATION: LOCATION: ABDOMEN

## 2025-08-16 ASSESSMENT — PAIN - FUNCTIONAL ASSESSMENT: PAIN_FUNCTIONAL_ASSESSMENT: 0-10

## 2025-08-16 ASSESSMENT — LIFESTYLE VARIABLES
HOW MANY STANDARD DRINKS CONTAINING ALCOHOL DO YOU HAVE ON A TYPICAL DAY: PATIENT DOES NOT DRINK
HOW OFTEN DO YOU HAVE A DRINK CONTAINING ALCOHOL: NEVER

## 2025-08-16 ASSESSMENT — PAIN SCALES - GENERAL: PAINLEVEL_OUTOF10: 10

## 2025-08-17 ENCOUNTER — APPOINTMENT (OUTPATIENT)
Dept: GENERAL RADIOLOGY | Age: 25
End: 2025-08-17
Payer: COMMERCIAL

## 2025-08-17 VITALS
WEIGHT: 163 LBS | SYSTOLIC BLOOD PRESSURE: 110 MMHG | RESPIRATION RATE: 20 BRPM | HEART RATE: 94 BPM | BODY MASS INDEX: 22.82 KG/M2 | TEMPERATURE: 98.1 F | DIASTOLIC BLOOD PRESSURE: 69 MMHG | OXYGEN SATURATION: 97 % | HEIGHT: 71 IN

## 2025-08-17 LAB
APAP SERPL-MCNC: <5 UG/ML (ref 10–30)
ETHANOLAMINE SERPL-MCNC: <10 MG/DL (ref 0–0.08)
SALICYLATES SERPL-MCNC: <0.5 MG/DL (ref 0–30)
TOXIC TRICYCLIC SC,BLOOD: NEGATIVE
TROPONIN I SERPL HS-MCNC: 13 NG/L (ref 0–22)

## 2025-08-17 PROCEDURE — 71045 X-RAY EXAM CHEST 1 VIEW: CPT

## 2025-08-17 RX ORDER — DROPERIDOL 2.5 MG/ML
1.25 INJECTION, SOLUTION INTRAMUSCULAR; INTRAVENOUS ONCE
Status: DISCONTINUED | OUTPATIENT
Start: 2025-08-17 | End: 2025-08-17 | Stop reason: HOSPADM

## 2025-08-19 LAB
EKG ATRIAL RATE: 83 BPM
EKG P AXIS: 54 DEGREES
EKG P-R INTERVAL: 124 MS
EKG Q-T INTERVAL: 362 MS
EKG QRS DURATION: 84 MS
EKG QTC CALCULATION (BAZETT): 425 MS
EKG R AXIS: 3 DEGREES
EKG T AXIS: 32 DEGREES
EKG VENTRICULAR RATE: 83 BPM

## 2025-08-19 PROCEDURE — 93010 ELECTROCARDIOGRAM REPORT: CPT | Performed by: INTERNAL MEDICINE
